# Patient Record
Sex: FEMALE | Race: ASIAN | Employment: OTHER | URBAN - METROPOLITAN AREA
[De-identification: names, ages, dates, MRNs, and addresses within clinical notes are randomized per-mention and may not be internally consistent; named-entity substitution may affect disease eponyms.]

---

## 2018-05-10 PROCEDURE — 86235 NUCLEAR ANTIGEN ANTIBODY: CPT | Performed by: FAMILY MEDICINE

## 2021-04-10 DIAGNOSIS — Z23 NEED FOR VACCINATION: ICD-10-CM

## 2024-04-27 RX ORDER — PRAVASTATIN SODIUM 40 MG
40 TABLET ORAL NIGHTLY
COMMUNITY
Start: 2023-07-11

## 2024-04-29 ENCOUNTER — ANESTHESIA EVENT (OUTPATIENT)
Dept: SURGERY | Facility: HOSPITAL | Age: 79
End: 2024-04-29
Payer: MEDICARE

## 2024-04-29 NOTE — H&P
HPI:   The patient is a 78 year old female with a right renal calculus. The stone was not clearly visible on a KUB on 2022. She has a mildly complex left renal cyst. She has had back pain which resolved with an epidural injection. She recently had right sided pain and a KUB on 3/30/2024 showed non obstructing right renal calculi up to 5 mm. An US did not show hydronephrosis but did show her stable minimally complex left renal cyst. Her pain is currently a 1 out of 10.            Current Outpatient Medications   Medication Sig Dispense Refill    Meloxicam 15 MG Oral Tab Take 1 tablet (15 mg total) by mouth daily. 30 tablet 3    amLODIPine Besy-Benazepril HCl 5-10 MG Oral Cap TAKE ONE CAPSULE BY MOUTH ONE TIME DAILY 90 capsule 1    pravastatin 40 MG Oral Tab Take 1 tablet (40 mg total) by mouth daily. 90 tablet 3    Coenzyme Q10 (CO Q-10 OR) Take  by mouth.        Calcium Carbonate-Vitamin D (OYSTER-D) 250-125 MG-UNIT Oral Tab Take 1 tablet by mouth daily.        MULTIVITAMINS OR TABS 1 TABLET DAILY              Crestor [Rosuvastat*    MYALGIA        Past Medical History:   Diagnosis Date    Esophageal reflux      HYPERLIPIDEMIA      HYPERTENSION              Past Surgical History:   Procedure Laterality Date    COLONOSCOPY         neg per pt, within past 5 years ()    COLONOSCOPY   04    COLONOSCOPY   16= Diverticulosis     Repeat PRN    COLONOSCOPY   2021      Dr. Austin-rectal adenoma, tics, moderately large internal hemorrhoids-5-year recall.    EGD   2021     Dr. Austin-fundic gland polyps, gastritis biopsies negative for H. pylori.    HEMORRHOIDECTOMY,INT/EXT,SIMPLE   4/21/10     Performed by JOSE F PANG at [facility] [place], [facility]             X3      Social History:  Social History    Tobacco Use      Smoking status: Never      Smokeless tobacco: Never    Vaping Use      Vaping Use: Never used    Alcohol use: No      Alcohol/week: 0.0 standard drinks of alcohol    Drug use:  No         REVIEW OF SYSTEMS:   GENERAL HEALTH: Feels well.  RESPIRATORY: Denies shortness of breath with exertion.  CARDIOVASCULAR: Denies chest pain on exertion.  GI: Denies abdominal pain or heartburn.  : --- See HPI.   NEURO: No sensory or motor complaint.     EXAM:   There were no vitals taken for this visit.  GENERAL: Well developed, well nourished female in no acute distress.  HEENT: Atraumatic, normocephalic.  CHEST: Respirations non-labored.   ABDOMEN: Soft, non-distended, and non-tender. No guarding.   : No flank tenderness.   NEURO: Alert and oriented.        Urinalysis: +blood trace     ASSESSMENT AND PLAN:   Right renal calculus    The patient wished to have right ESWL.  We discussed the risks, benefits, complications and alternatives including but not limited to hematuria, perinephric hematoma, and the possible need for multiple treatments at length.  The patient expressed understanding and wished to proceed.

## 2024-04-29 NOTE — DISCHARGE INSTRUCTIONS
If you had a sedative, general anesthesia, or spinal anesthesia, you must have someone drive you home. Once you’re home:  Drink plenty of fluids.  You may have burning or light bleeding when you pee. This is normal.  Medicines may be prescribed to ease any mild pain or prevent infection. Take these as directed.  When to call your healthcare provider  Call your healthcare provider if you have any of the following after the procedure:  Heavy bleeding or blood clots  Burning that lasts more than 1 day  Fever of   100° F  ( 38°C ) or higher, or as directed by your provider  Trouble peeing         AMBSURG HOME CARE INSTRUCTIONS: POST-OP ANESTHESIA  The medication that you received for sedation or general anesthesia can last up to 24 hours. Your judgment and reflexes may be altered, even if you feel like your normal self.      We Recommend:   Do not drive any motor vehicle or bicycle   Avoid mowing the lawn, playing sports, or working with power tools/applicances (power saws, electric knives or mixers)   That you have someone stay with you on your first night home   Do not drink alcohol or take sleeping pills or tranquilizers   Do not sign legal documents within 24 hours of your procedure   If you had a nerve block for your surgery, take extra care not to put any pressure on your arm or hand for 24 hours    It is normal:  For you to have a sore throat if you had a breathing tube during surgery (while you were asleep!). The sore throat should get better within 48 hours. You can gargle with warm salt water (1/2 tsp in 4 oz warm water) or use a throat lozenge for comfort  To feel muscle aches or soreness especially in the abdomen, chest or neck. The achy feeling should go away in the next 24 hours  To feel weak, sleepy or \"wiped out\". Your should start feeling better in the next 24 hours.   To experience mild discomforts such as sore lip or tongue, headache, cramps, gas pains or a bloated feeling in your abdomen.   To  experience mild back pain or soreness for a day or two if you had spinal or epidural anesthesia.   If you had laparoscopic surgery, to feel shoulder pain or discomfort on the day of surgery.   For some patients to have nausea after surgery/anesthesia    If you feel nausea or experience vomiting:   Try to move around less.   Eat less than usual or drink only liquids until the next morning   Nausea should resolve in about 24 hours    If you have a problem when you are at home:    Call your surgeons office   Discharge Instructions: After Your Surgery  You’ve just had surgery. During surgery, you were given medicine called anesthesia to keep you relaxed and free of pain. After surgery, you may have some pain or nausea. This is common. Here are some tips for feeling better and getting well after surgery.   Going home  Your healthcare provider will show you how to take care of yourself when you go home. They'll also answer your questions. Have an adult family member or friend drive you home. For the first 24 hours after your surgery:   Don't drive or use heavy equipment.  Don't make important decisions or sign legal papers.  Take medicines as directed.  Don't drink alcohol.  Have someone stay with you, if needed. They can watch for problems and help keep you safe.  Be sure to go to all follow-up visits with your healthcare provider. And rest after your surgery for as long as your provider tells you to.   Coping with pain  If you have pain after surgery, pain medicine will help you feel better. Take it as directed, before pain becomes severe. Also, ask your healthcare provider or pharmacist about other ways to control pain. This might be with heat, ice, or relaxation. And follow any other instructions your surgeon or nurse gives you.      Stay on schedule with your medicine.     Tips for taking pain medicine  To get the best relief possible, remember these points:   Pain medicines can upset your stomach. Taking them with a  little food may help.  Most pain relievers taken by mouth need at least 20 to 30 minutes to start to work.  Don't wait till your pain becomes severe before you take your medicine. Try to time your medicine so that you can take it before starting an activity. This might be before you get dressed, go for a walk, or sit down for dinner.  Constipation is a common side effect of some pain medicines. Call your healthcare provider before taking any medicines such as laxatives or stool softeners to help ease constipation. Also ask if you should skip any foods. Drinking lots of fluids and eating foods such as fruits and vegetables that are high in fiber can also help. Remember, don't take laxatives unless your surgeon has prescribed them.  Drinking alcohol and taking pain medicine can cause dizziness and slow your breathing. It can even be deadly. Don't drink alcohol while taking pain medicine.  Pain medicine can make you react more slowly to things. Don't drive or run machinery while taking pain medicine.  Your healthcare provider may tell you to take acetaminophen to help ease your pain. Ask them how much you're supposed to take each day. Acetaminophen or other pain relievers may interact with your prescription medicines or other over-the-counter (OTC) medicines. Some prescription medicines have acetaminophen and other ingredients in them. Using both prescription and OTC acetaminophen for pain can cause you to accidentally overdose. Read the labels on your OTC medicines with care. This will help you to clearly know the list of ingredients, how much to take, and any warnings. It may also help you not take too much acetaminophen. If you have questions or don't understand the information, ask your pharmacist or healthcare provider to explain it to you before you take the OTC medicine.   Managing nausea  Some people have an upset stomach (nausea) after surgery. This is often because of anesthesia, pain, or pain medicine, less  movement of food in the stomach, or the stress of surgery. These tips will help you handle nausea and eat healthy foods as you get better. If you were on a special food plan before surgery, ask your healthcare provider if you should follow it while you get better. Check with your provider on how your eating should progress. It may depend on the surgery you had. These general tips may help:   Don't push yourself to eat. Your body will tell you when to eat and how much.  Start off with clear liquids and soup. They're easier to digest.  Next try semi-solid foods as you feel ready. These include mashed potatoes, applesauce, and gelatin.  Slowly move to solid foods. Don’t eat fatty, rich, or spicy foods at first.  Don't force yourself to have 3 large meals a day. Instead eat smaller amounts more often.  Take pain medicines with a small amount of solid food, such as crackers or toast. This helps prevent nausea.  When to call your healthcare provider  Call your healthcare provider right away if you have any of these:   You still have too much pain, or the pain gets worse, after taking the medicine. The medicine may not be strong enough. Or there may be a complication from the surgery.  You feel too sleepy, dizzy, or groggy. The medicine may be too strong.  Side effects such as nausea or vomiting. Your healthcare provider may advise taking other medicines to .  Skin changes such as rash, itching, or hives. This may mean you have an allergic reaction. Your provider may advise taking other medicines.  The incision looks different (for instance, part of it opens up).  Bleeding or fluid leaking from the incision site, and weren't told to expect that.  Fever of 100.4°F (38°C) or higher, or as directed by your provider.  Call 911  Call 911 right away if you have:   Trouble breathing  Facial swelling    If you have obstructive sleep apnea   You were given anesthesia medicine during surgery to keep you comfortable and free of pain.  After surgery, you may have more apnea spells because of this medicine and other medicines you were given. The spells may last longer than normal.    At home:  Keep using the continuous positive airway pressure (CPAP) device when you sleep. Unless your healthcare provider tells you not to, use it when you sleep, day or night. CPAP is a common device used to treat obstructive sleep apnea.  Talk with your provider before taking any pain medicine, muscle relaxants, or sedatives. Your provider will tell you about the possible dangers of taking these medicines.  Contact your provider if your sleeping changes a lot even when taking medicines as directed.  [employer] last reviewed this educational content on 10/1/2021  © 0731-8642 [employer]. All rights reserved. This information is not intended as a substitute for professional medical care. Always follow your healthcare professional's instructions.

## 2024-04-30 ENCOUNTER — HOSPITAL ENCOUNTER (OUTPATIENT)
Facility: HOSPITAL | Age: 79
Setting detail: HOSPITAL OUTPATIENT SURGERY
Discharge: HOME | End: 2024-04-30
Attending: UROLOGY | Admitting: UROLOGY
Payer: MEDICARE

## 2024-04-30 ENCOUNTER — ANESTHESIA (OUTPATIENT)
Dept: SURGERY | Facility: HOSPITAL | Age: 79
End: 2024-04-30
Payer: MEDICARE

## 2024-04-30 VITALS
DIASTOLIC BLOOD PRESSURE: 79 MMHG | BODY MASS INDEX: 20.2 KG/M2 | RESPIRATION RATE: 18 BRPM | TEMPERATURE: 97 F | OXYGEN SATURATION: 100 % | HEART RATE: 68 BPM | HEIGHT: 61 IN | WEIGHT: 107 LBS | SYSTOLIC BLOOD PRESSURE: 140 MMHG

## 2024-04-30 PROCEDURE — 0TF38ZZ FRAGMENTATION IN RIGHT KIDNEY PELVIS, VIA NATURAL OR ARTIFICIAL OPENING ENDOSCOPIC: ICD-10-PCS | Performed by: UROLOGY

## 2024-04-30 RX ORDER — HYDROMORPHONE HYDROCHLORIDE 1 MG/ML
0.2 INJECTION, SOLUTION INTRAMUSCULAR; INTRAVENOUS; SUBCUTANEOUS EVERY 5 MIN PRN
Status: DISCONTINUED | OUTPATIENT
Start: 2024-04-30 | End: 2024-04-30

## 2024-04-30 RX ORDER — PHENYLEPHRINE HCL 10 MG/ML
VIAL (ML) INJECTION AS NEEDED
Status: DISCONTINUED | OUTPATIENT
Start: 2024-04-30 | End: 2024-04-30 | Stop reason: SURG

## 2024-04-30 RX ORDER — SODIUM CHLORIDE, SODIUM LACTATE, POTASSIUM CHLORIDE, CALCIUM CHLORIDE 600; 310; 30; 20 MG/100ML; MG/100ML; MG/100ML; MG/100ML
INJECTION, SOLUTION INTRAVENOUS CONTINUOUS
Status: DISCONTINUED | OUTPATIENT
Start: 2024-04-30 | End: 2024-04-30

## 2024-04-30 RX ORDER — NALOXONE HYDROCHLORIDE 0.4 MG/ML
0.08 INJECTION, SOLUTION INTRAMUSCULAR; INTRAVENOUS; SUBCUTANEOUS AS NEEDED
Status: DISCONTINUED | OUTPATIENT
Start: 2024-04-30 | End: 2024-04-30

## 2024-04-30 RX ORDER — EPHEDRINE SULFATE 50 MG/ML
INJECTION, SOLUTION INTRAVENOUS AS NEEDED
Status: DISCONTINUED | OUTPATIENT
Start: 2024-04-30 | End: 2024-04-30 | Stop reason: SURG

## 2024-04-30 RX ORDER — LIDOCAINE HYDROCHLORIDE 10 MG/ML
INJECTION, SOLUTION EPIDURAL; INFILTRATION; INTRACAUDAL; PERINEURAL AS NEEDED
Status: DISCONTINUED | OUTPATIENT
Start: 2024-04-30 | End: 2024-04-30 | Stop reason: SURG

## 2024-04-30 RX ORDER — ACETAMINOPHEN 500 MG
1000 TABLET ORAL ONCE
Status: COMPLETED | OUTPATIENT
Start: 2024-04-30 | End: 2024-04-30

## 2024-04-30 RX ADMIN — EPHEDRINE SULFATE 10 MG: 50 INJECTION, SOLUTION INTRAVENOUS at 12:01:00

## 2024-04-30 RX ADMIN — PHENYLEPHRINE HCL 200 MCG: 10 MG/ML VIAL (ML) INJECTION at 11:57:00

## 2024-04-30 RX ADMIN — LIDOCAINE HYDROCHLORIDE 50 MG: 10 INJECTION, SOLUTION EPIDURAL; INFILTRATION; INTRACAUDAL; PERINEURAL at 11:50:00

## 2024-04-30 NOTE — OPERATIVE REPORT
[facility]  Urology Procedure Note  Yuliya Monson Patient Status:  Hospital Outpatient Surgery    1945 MRN [id number]   Location [facility] OPERATING ROOM Attending Antonio Dexter MD    Day # 0 [facility] Pamella Guevara MD     Procedure: Attempted shockwave lithotripsy    Pre-Op Diagnosis:  Right renal calculus    Post-Op Diagnosis: No radio-opaque renal calculus    Procedure:   The patient was brought to the operating room where satisfactory general anesthesia was obtained.  The airway was controlled with the laryngeal mask.  The patient was placed in the supine position and fluoroscopy was used to localize the stone.  The stone was unable to be identified in the right kidney. The calcifications seen on KUB were not in kidney when viewed with fluoroscopy. No renal calculus could be identified in the right kidney and the decision was made to terminate the procedure. The patient was awakened and taken to the recovery area in stable condition.     Indications: 78 year old female with a history of right flank pain found to have a 5 mm renal calculus on ultrasound and KUB.     Surgeon:  Dr. Antonio Dexter    Anesthesia:  General    Findings:  The stone was unable to be identified in the right kidney. The calcifications seen on KUB were not in kidney when viewed with fluoroscopy. No renal calculus could be identified in the right kidney.    Specimens: None    Blood Loss:  0 mL    Complications:   No stone identified.    Drains:  None    Secondary Diagnosis:  None    Antonio Dexter MD  2024

## 2024-04-30 NOTE — ANESTHESIA PREPROCEDURE EVALUATION
Anesthesia PreOp Note    HPI:     Yuliya Monson is a 78 year old female who presents for preoperative consultation requested by: Antonio Dexter MD    Date of Surgery: 2024    Procedure(s):  Right extracorporeal shockwave lithotripsy, possbile cystoscopy, possible right ureteral stent placement  CYSTOSCOPY STENT INSERTION  Indication: Right renal calculus    Relevant Problems   No relevant active problems       NPO:                         History Review:  Patient Active Problem List    Diagnosis Date Noted    Chronic bilateral low back pain without sciatica 2016    Atherosclerosis of aorta (HCC) 2016    IFG (impaired fasting glucose) 2016    Osteopenia 2016    OAB (overactive bladder) 2016    Dyslipidemia 10/02/2009    Essential hypertension 2008       Past Medical History:    Back problem    Esophageal reflux    HYPERLIPIDEMIA    HYPERTENSION    Osteoarthritis    Visual impairment    Wears glasses       Past Surgical History:   Procedure Laterality Date    Cataract Bilateral     With IOL implants    Colonoscopy      neg per pt, within past 5 years ()    Colonoscopy  2004    Colonoscopy  16= Diverticulosis    Repeat PRN    Colonoscopy  2021     Dr. Austin-rectal adenoma, tics, moderately large internal hemorrhoids-5-year recall.    Egd  2021    Dr. Austin-fundic gland polyps, gastritis biopsies negative for H. pylori.    Hemorrhoidectomy,int/ext,simple  2010    Performed by JOSE F PANG at [facility] [place], [facility]          X3       No medications prior to admission.     No current Epic-ordered facility-administered medications on file.     Current Outpatient Medications Ordered in Epic   Medication Sig Dispense Refill    pravastatin 40 MG Oral Tab Take 1 tablet (40 mg total) by mouth nightly.      Coenzyme Q10 (CO Q-10 OR) Take  by mouth.      Calcium Carbonate-Vitamin D (OYSTER-D) 250-125 MG-UNIT Oral Tab Take 1 tablet by mouth  daily.      MULTIVITAMINS OR TABS 1 TABLET DAILY       amLODIPine Besy-Benazepril HCl 5-10 MG Oral Cap Take 1 capsule by mouth daily. (Patient taking differently: Take 1 capsule by mouth every morning.) 90 capsule 3       No Known Allergies    Family History   Problem Relation Age of Onset    Cancer Sister         breast ancer    Breast Cancer Sister 45        45    Hypertension Father     Lipids Father     Lipids Mother     Hypertension Mother     Cancer Other         breast cancer-paternal cousin    Breast Cancer Cousin 55        dx 55    Breast Cancer Paternal Aunt 70        dx 70    Heart Disorder Neg      Social History     Socioeconomic History    Marital status:    Tobacco Use    Smoking status: Never    Smokeless tobacco: Never   Vaping Use    Vaping status: Never Used   Substance and Sexual Activity    Alcohol use: No     Alcohol/week: 0.0 standard drinks of alcohol    Drug use: No       Available pre-op labs reviewed.             Vital Signs:  Body mass index is 21.35 kg/m².   height is 1.549 m (5' 1\") and weight is 51.3 kg (113 lb).   Vitals:    04/27/24 0812   Weight: 51.3 kg (113 lb)   Height: 1.549 m (5' 1\")        Anesthesia Evaluation     Patient summary reviewed and Nursing notes reviewed    Airway   Mallampati: II  TM distance: >3 FB  Neck ROM: full  Dental      Pulmonary     breath sounds clear to auscultation  Cardiovascular   (+) hypertension    Rhythm: regular    Neuro/Psych      GI/Hepatic/Renal    (+) GERD    Endo/Other    Abdominal                  Anesthesia Plan:   ASA:  3  Plan:   General  Airway:  LMA  Informed Consent Plan and Risks Discussed With:  Patient      I have informed Yuliya Monson and/or legal guardian or family member of the nature of the anesthetic plan, benefits, risks including possible dental damage if relevant, major complications, and any alternative forms of anesthetic management.   All of the patient's questions were answered to the best of my ability.  The patient desires the anesthetic management as planned.  Racquel Thakkar MD  4/30/2024 8:20 AM  Present on Admission:  **None**

## 2024-04-30 NOTE — INTERVAL H&P NOTE
Pre-op Diagnosis: Right renal calculus    The above referenced H&P was reviewed by Antonio Dexter MD on 4/30/2024, the patient was examined and no significant changes have occurred in the patient's condition since the H&P was performed.  I discussed with the patient and/or legal representative the potential benefits, risks and side effects of this procedure; the likelihood of the patient achieving goals; and potential problems that might occur during recuperation.  I discussed reasonable alternatives to the procedure, including risks, benefits and side effects related to the alternatives and risks related to not receiving this procedure.  We will proceed with procedure as planned.

## 2024-04-30 NOTE — ANESTHESIA POSTPROCEDURE EVALUATION
Patient: Yuliya Monson    Procedure Summary       Date: 04/30/24 Room / Location: [facility] MAIN OR  / [facility] MAIN OR    Anesthesia Start: 1142 Anesthesia Stop: 1233    Procedure: Attempted Right extracorporeal shockwave lithotripsy (Right: Flank) Diagnosis: (Right renal calculus)    Surgeons: Antonio Dexter MD Anesthesiologist: Racquel Thakkar MD    Anesthesia Type: general ASA Status: 3            Anesthesia Type: general    Vitals Value Taken Time   /77 04/30/24 1229   Temp 97 04/30/24 1233   Pulse 79 04/30/24 1233   Resp 21 04/30/24 1233   SpO2 100 % 04/30/24 1233   Vitals shown include unfiled device data.    [facility] AN Post Evaluation:   Patient Evaluated in PACU  Patient Participation: complete - patient participated  Level of Consciousness: awake  Pain Score: 0  Pain Management: adequate  Nausea/Vomiting: none  Cardiovascular Status: acceptable  Postoperative Hydration acceptable      Racquel Thakkar MD  4/30/2024 12:33 PM

## (undated) DEVICE — SLEEVE COMPR MD KNEE LEN SGL USE KENDALL SCD

## (undated) DEVICE — Device